# Patient Record
Sex: MALE | Race: WHITE | NOT HISPANIC OR LATINO | URBAN - METROPOLITAN AREA
[De-identification: names, ages, dates, MRNs, and addresses within clinical notes are randomized per-mention and may not be internally consistent; named-entity substitution may affect disease eponyms.]

---

## 2020-06-22 NOTE — PATIENT DISCUSSION
CATARACT, OU - VISUALLY SIGNIFICANT OS &gt; OD. SCHEDULE SX OS THEN LATER IN OD IF VISUAL SYMPTOMS PERSIST. GLS RX GIVEN TO FILL IF DESIRES IN THE EVENT PT DOES NOT PROCEED W/ SX. THE PT FEELS GLARE MAY STILL BE A PROBLEM AND CHOOSES NOT TO TRY NEW GLASSES.

## 2020-06-22 NOTE — PATIENT DISCUSSION
New Prescription: prednisolone acetate (prednisolone acetate): drops,suspension: 1% 1 drop four times a day as directed 06-

## 2020-07-02 NOTE — PATIENT DISCUSSION
Continue: prednisolone acetate (prednisolone acetate): drops,suspension: 1% 1 drop four times a day as directed 06-

## 2020-07-02 NOTE — PATIENT DISCUSSION
S/P PCIOL, OS_ - STABLE, DOING WELL. OK TO PROCEED WITH FELLOW EYE SURGERY. PT REPORTS THAT THEY ARE HAPPY WITH THE OUTCOME OF THE OPERATIVE EYE AND READY TO PURSUE SX IN THE FELLOW EYE.

## 2020-07-09 NOTE — PATIENT DISCUSSION
***This patient had traditional cataract surgery performed. A monofocal IOL was placed to achieve a target refraction of PLANO (which should provide them with satisfactory vision with the aid of glasses/contact lenses). ***

## 2022-07-08 ENCOUNTER — NEW PATIENT (OUTPATIENT)
Dept: URBAN - METROPOLITAN AREA CLINIC 14 | Facility: CLINIC | Age: 77
End: 2022-07-08

## 2022-07-08 DIAGNOSIS — H02.423: ICD-10-CM

## 2022-07-08 DIAGNOSIS — H02.832: ICD-10-CM

## 2022-07-08 DIAGNOSIS — H02.835: ICD-10-CM

## 2022-07-08 PROCEDURE — 92082 INTERMEDIATE VISUAL FIELD XM: CPT

## 2022-07-08 PROCEDURE — 92285 EXTERNAL OCULAR PHOTOGRAPHY: CPT

## 2022-07-08 PROCEDURE — 99204 OFFICE O/P NEW MOD 45 MIN: CPT

## 2022-07-08 ASSESSMENT — VISUAL ACUITY
OD_SC: 20/25
OS_SC: 20/25

## 2022-08-10 ENCOUNTER — POST-OP (OUTPATIENT)
Dept: URBAN - METROPOLITAN AREA CLINIC 14 | Facility: CLINIC | Age: 77
End: 2022-08-10

## 2022-08-10 DIAGNOSIS — H02.423: ICD-10-CM

## 2022-08-10 DIAGNOSIS — H02.835: ICD-10-CM

## 2022-08-10 DIAGNOSIS — H02.832: ICD-10-CM

## 2022-08-10 DIAGNOSIS — Z98.890: ICD-10-CM

## 2022-08-10 PROCEDURE — 99024 POSTOP FOLLOW-UP VISIT: CPT

## 2022-08-10 ASSESSMENT — VISUAL ACUITY
OD_SC: 20/25
OS_SC: 20/25

## 2022-09-07 ENCOUNTER — POST-OP (OUTPATIENT)
Dept: URBAN - METROPOLITAN AREA CLINIC 14 | Facility: CLINIC | Age: 77
End: 2022-09-07

## 2022-09-07 DIAGNOSIS — H02.835: ICD-10-CM

## 2022-09-07 DIAGNOSIS — H02.832: ICD-10-CM

## 2022-09-07 DIAGNOSIS — H02.423: ICD-10-CM

## 2022-09-07 DIAGNOSIS — Z98.890: ICD-10-CM

## 2022-09-07 PROCEDURE — 99024 POSTOP FOLLOW-UP VISIT: CPT

## 2022-09-07 ASSESSMENT — VISUAL ACUITY
OD_SC: 20/25
OS_SC: 20/25

## 2022-12-14 ENCOUNTER — ESTABLISHED PATIENT (OUTPATIENT)
Dept: URBAN - METROPOLITAN AREA CLINIC 14 | Facility: CLINIC | Age: 77
End: 2022-12-14

## 2022-12-14 PROCEDURE — 99214 OFFICE O/P EST MOD 30 MIN: CPT

## 2022-12-14 PROCEDURE — 92082 INTERMEDIATE VISUAL FIELD XM: CPT

## 2022-12-14 PROCEDURE — 92285 EXTERNAL OCULAR PHOTOGRAPHY: CPT

## 2022-12-14 ASSESSMENT — VISUAL ACUITY
OD_SC: 20/25
OS_SC: 20/25